# Patient Record
Sex: FEMALE | Race: WHITE | NOT HISPANIC OR LATINO | Employment: UNEMPLOYED | RURAL
[De-identification: names, ages, dates, MRNs, and addresses within clinical notes are randomized per-mention and may not be internally consistent; named-entity substitution may affect disease eponyms.]

---

## 2022-12-14 ENCOUNTER — TELEPHONE (OUTPATIENT)
Dept: FAMILY MEDICINE CLINIC | Facility: CLINIC | Age: 32
End: 2022-12-14

## 2022-12-15 ENCOUNTER — TELEMEDICINE (OUTPATIENT)
Dept: FAMILY MEDICINE CLINIC | Facility: CLINIC | Age: 32
End: 2022-12-15

## 2022-12-15 DIAGNOSIS — F41.0 PANIC ATTACKS: ICD-10-CM

## 2022-12-15 DIAGNOSIS — F32.A ANXIETY AND DEPRESSION: Primary | ICD-10-CM

## 2022-12-15 DIAGNOSIS — Z76.89 ENCOUNTER TO ESTABLISH CARE: ICD-10-CM

## 2022-12-15 DIAGNOSIS — F41.9 ANXIETY AND DEPRESSION: Primary | ICD-10-CM

## 2022-12-15 DIAGNOSIS — F19.11 HISTORY OF DRUG ABUSE: ICD-10-CM

## 2022-12-15 PROCEDURE — 99204 OFFICE O/P NEW MOD 45 MIN: CPT | Performed by: NURSE PRACTITIONER

## 2022-12-15 RX ORDER — HYDROXYZINE PAMOATE 100 MG/1
CAPSULE ORAL
Qty: 60 CAPSULE | Refills: 1 | Status: SHIPPED | OUTPATIENT
Start: 2022-12-15 | End: 2023-01-17 | Stop reason: SDUPTHER

## 2022-12-15 RX ORDER — FLUOXETINE HYDROCHLORIDE 20 MG/1
20 CAPSULE ORAL DAILY
Qty: 30 CAPSULE | Refills: 3 | Status: SHIPPED | OUTPATIENT
Start: 2022-12-15

## 2022-12-17 PROBLEM — F41.9 ANXIETY AND DEPRESSION: Status: ACTIVE | Noted: 2022-12-17

## 2022-12-17 PROBLEM — F41.0 PANIC ATTACKS: Status: ACTIVE | Noted: 2022-12-17

## 2022-12-17 PROBLEM — F32.A ANXIETY AND DEPRESSION: Status: ACTIVE | Noted: 2022-12-17

## 2022-12-17 PROBLEM — R00.2 PALPITATIONS: Status: ACTIVE | Noted: 2022-12-17

## 2022-12-17 PROBLEM — F19.11 HISTORY OF DRUG ABUSE: Status: ACTIVE | Noted: 2022-12-17

## 2022-12-17 PROBLEM — Z76.89 ENCOUNTER TO ESTABLISH CARE: Status: ACTIVE | Noted: 2022-12-17

## 2022-12-17 NOTE — ASSESSMENT & PLAN NOTE
She has previously been under the care of a PCP in [place]. I will try to obtain those records. We need to focus on getting her established with a provider to address her mental health, mainly anxiety. We will refer to GYN to establish care. She has been instructed to begin home BP monitoring  She is going to have labs drawn for review  Return to clinic in four weeks for labs review and evaluated effectiveness of anti-depressant therapy

## 2022-12-17 NOTE — PROGRESS NOTES
Office Note     Name: Karley Greene    : 1990     MRN: [id number]     Chief Complaint  Anxiety (Establish care)    Subjective     History of Present Illness:  Karley Greene is a 32 y.o. female who presents today to [facility]. Her main focus today is her anxiety and depression history. She reports he issues with anxiety and depression started approximately 5-7 years ago. She feels these issues stem from a series of life events including a landy childhood, to losing one child to death and the others being removed from the home due to drug use. She has history of narcotic and heroin abuse for more than 15 years, currently seeking treatment at a methadone clinic in [place], [place]. She denies any history of IVDA. In the past she has seen numerous counselors and therapists but is currently not under the care of any mental health professional. She reports frequent panic attacks, she feels light-headed, shaky, her heart races and her tongue goes numb. These attacks are anywhere from 10-20 minutes in duration. She reports being evaluated in ED at [place] last week during an attack, thinking she was having a heart attack but EKG and labs were normal. She was given vistaril 50mg on that visit which she said had given her a little improvement in dealing with these attacks. She is not currently on any anti-depressant therapy.   She doesn't check her BP regularly but does endorse having access to a BP cuff at home. Last pap smear three years ago. No family history of GYN or colon CA. She denies alcohol abuse. She has no further complaints or concerns. today    Review of Systems   Constitutional: Negative for fatigue.   Eyes: Negative for blurred vision and double vision.   Respiratory: Negative for cough, chest tightness, shortness of breath and wheezing.    Cardiovascular: Positive for palpitations. Negative for chest pain and leg swelling.   Gastrointestinal: Negative for abdominal pain, constipation, nausea  and vomiting.   Endocrine: Negative for polydipsia and polyuria.   Genitourinary: Negative for difficulty urinating, frequency and urinary incontinence.   Musculoskeletal: Negative for arthralgias and myalgias.   Skin: Negative for rash.   Neurological: Negative for dizziness, syncope, weakness, light-headedness and headache.   Psychiatric/Behavioral: Positive for depressed mood and stress. Negative for self-injury. The patient is nervous/anxious.        Objective     History reviewed. No pertinent past medical history.  History reviewed. No pertinent surgical history.  History reviewed. No pertinent family history.    Vital Signs  There were no vitals taken for this visit.  There is no height or weight on file to calculate BMI.    Physical Exam  Constitutional:       Appearance: Normal appearance.      Comments: Limited PE due to telehealth   HENT:      Head: Normocephalic and atraumatic.      Nose: Nose normal.      Mouth/Throat:      Mouth: Mucous membranes are moist.      Pharynx: Oropharynx is clear.   Eyes:      Conjunctiva/sclera: Conjunctivae normal.   Pulmonary:      Effort: Pulmonary effort is normal. No respiratory distress.   Skin:     General: Skin is dry.   Neurological:      Mental Status: She is alert and oriented to person, place, and time.   Psychiatric:         Mood and Affect: Mood normal.         Behavior: Behavior normal.         Thought Content: Thought content normal.         Judgment: Judgment normal.        POCT Results (if applicable):  No results found for this or any previous visit.         Assessment and Plan     Diagnoses and all orders for this visit:    1. Anxiety and depression (Primary)  Assessment & Plan:  She feels these issues stem from a series of life events including a landy childhood, to losing one child to death and the others being removed from the home due to drug use. She has been in treatment at various times in her life with therapists and counselors but current is  not. She denies any feelings of self harm or injury. We discussed the importance of getting a definitive plan to address her mental health beyond just medication.     -Refer to [facility] mental health services  -Will start prozac 20mg daily  -[facility] in four weeks for follow-up    Orders:  -     FLUoxetine (PROzac) 20 MG capsule; Take 1 capsule by mouth Daily.  Dispense: 30 capsule; Refill: 3    2. Panic attacks  Assessment & Plan:  She reports frequent panic attacks, she feels light-headed, shaky, her heart races and her tongue goes numb. These attacks are anywhere from 10-20 minutes in duration. She reports being evaluated in ED at [place] last week during an attack, thinking she was having a heart attack but EKG and labs were normal. She was given vistaril 50mg on that visit which she said had given her a little improvement in dealing with these attacks.    -Will give Vistaril 100mg every six hour PRN for panic attacks  -Will get records from [place] ED visit for review    Orders:  -     hydrOXYzine pamoate (VISTARIL) 100 MG capsule; Take one capsule by mouth every six hours as needed for panic attacks  Dispense: 60 capsule; Refill: 1    3. Encounter to establish care  Assessment & Plan:  She has previously been under the care of a PCP in [place]. I will try to obtain those records. We need to focus on getting her established with a provider to address her mental health, mainly anxiety. We will refer to GYN to establish care. She has been instructed to begin home BP monitoring  She is going to have labs drawn for review  Return to clinic in four weeks for labs review and evaluated effectiveness of anti-depressant therapy    Orders:  -     Lipid Panel; Future  -     TSH Rfx On Abnormal To Free T4; Future  -     Comprehensive Metabolic Panel; Future  -     CBC & Differential; Future  -     Urinalysis With Microscopic If Indicated (No Culture) - Urine, Clean Catch; Future  -     HIV-1 / O / 2 Ag / Antibody 4th Generation;  Future  -     Hepatitis C Antibody; Future  -     Ambulatory Referral to Gynecology    4. History of drug abuse (HCC)  Assessment & Plan:  She has history of narcotic and heroin abuse for more than 15 years, currently seeking treatment at a methadone clinic in [place], [place]. She denies any history of IVDA.   She verbalizes wanting to talk about possibly already weaning off methadone with her clinic later today. I advised we get her established with a mental health/addiction specialist before she make a move this drastic if she truly wants to beat her addiction.       BMI cannot be calculated due to outdated height or weight values.  Please input a current height/weight in Vitals and re-renter [id number] in Note to pull in correct documentation based on BMI range.        Vaccine Counseling:  “Discussed risks/benefits to vaccination, reviewed components of the vaccine, discussed VIS, discussed informed consent, informed consent obtained. Patient/Parent was allowed to accept or refuse vaccine. Questions answered to satisfactory state of patient/Parent. We reviewed typical age appropriate and seasonally appropriate vaccinations. Reviewed immunization history and updated state vaccination form as needed. Patient was counseled on COVID-19  DTap/DT  Influenza      A dvanced Care Planning:   Patient does not have an advance directive, information provided.    Follow Up  Return in about 4 weeks (around 1/12/2023) for Next scheduled follow up.    Annabel Castillo, APRN

## 2022-12-17 NOTE — ASSESSMENT & PLAN NOTE
She feels these issues stem from a series of life events including a landy childhood, to losing one child to death and the others being removed from the home due to drug use. She has been in treatment at various times in her life with therapists and counselors but current is not. She denies any feelings of self harm or injury. We discussed the importance of getting a definitive plan to address her mental health beyond just medication.     -Refer to [facility] mental health services  -Will start prozac 20mg daily  -[facility] in four weeks for follow-up

## 2022-12-17 NOTE — ASSESSMENT & PLAN NOTE
She reports frequent panic attacks, she feels light-headed, shaky, her heart races and her tongue goes numb. These attacks are anywhere from 10-20 minutes in duration. She reports being evaluated in ED at [place] last week during an attack, thinking she was having a heart attack but EKG and labs were normal. She was given vistaril 50mg on that visit which she said had given her a little improvement in dealing with these attacks.    -Will give Vistaril 100mg every six hour PRN for panic attacks  -Will get records from [place] ED visit for review

## 2022-12-17 NOTE — ASSESSMENT & PLAN NOTE
She has history of narcotic and heroin abuse for more than 15 years, currently seeking treatment at a methadone clinic in [place], [place]. She denies any history of IVDA.   She verbalizes wanting to talk about possibly already weaning off methadone with her clinic later today. I advised we get her established with a mental health/addiction specialist before she make a move this drastic if she truly wants to beat her addiction.

## 2023-01-17 ENCOUNTER — TELEPHONE (OUTPATIENT)
Dept: FAMILY MEDICINE CLINIC | Facility: CLINIC | Age: 33
End: 2023-01-17
Payer: MEDICAID

## 2023-01-17 DIAGNOSIS — F41.0 PANIC ATTACKS: ICD-10-CM

## 2023-01-17 RX ORDER — HYDROXYZINE PAMOATE 100 MG/1
CAPSULE ORAL
Qty: 60 CAPSULE | Refills: 1 | Status: SHIPPED | OUTPATIENT
Start: 2023-01-17

## 2023-01-17 RX ORDER — HYDROXYZINE PAMOATE 100 MG/1
CAPSULE ORAL
Qty: 60 CAPSULE | Refills: 1 | Status: CANCELLED | OUTPATIENT
Start: 2023-01-17

## 2023-01-17 NOTE — TELEPHONE ENCOUNTER
Caller: Karley Greene    Relationship: Self    Best call back number: [phone]    Requested Prescriptions:   Requested Prescriptions     Pending Prescriptions Disp Refills   • hydrOXYzine pamoate (VISTARIL) 100 MG capsule 60 capsule 1     Sig: Take one capsule by mouth every six hours as needed for panic attacks        Pharmacy where request should be sent:  [facility] [address] [place] [address] - [phone]  - [phone]   P: [phone]      Additional details provided by patient: COMPLETELY OUT    Does the patient have less than a 3 day supply:  [x] Yes  [] No    Would you like a call back once the refill request has been completed: [x] Yes [] No    If the office needs to give you a call back, can they leave a voicemail: [x] Yes [] No    Judi Geronimo Rep   01/17/23 09:25 EST